# Patient Record
Sex: FEMALE | Race: WHITE | NOT HISPANIC OR LATINO | Employment: OTHER | URBAN - METROPOLITAN AREA
[De-identification: names, ages, dates, MRNs, and addresses within clinical notes are randomized per-mention and may not be internally consistent; named-entity substitution may affect disease eponyms.]

---

## 2023-03-24 ENCOUNTER — DOCUMENTATION ONLY (OUTPATIENT)
Dept: TRANSPLANT | Facility: CLINIC | Age: 53
End: 2023-03-24

## 2023-03-24 ENCOUNTER — TELEPHONE (OUTPATIENT)
Dept: TRANSPLANT | Facility: CLINIC | Age: 53
End: 2023-03-24

## 2023-03-24 NOTE — TELEPHONE ENCOUNTER
"Donor Intake Start:3/11/23Donor Intake Complete:3/11/23  Expiration Date:23  Gender:FemalePreferred Language:English  Full Name:Josy CHEUNG Rose  Needed:[not answered]  Phone Number:[phone] Phone:  Contact Preference:[not answered]Best Contact Time:9am - 2pm  Emergency Contact:Ulises MIGUEL Polina Contact #:[phone]  Relationship to Contact:Contact is my spouse  :66Age:52  Country:[facility]  Address:[address] [place] [address] [place]  State:[place]:[id number]  Height:5'2\"Weight:190lbs  BMI:34.8  Employment Status:EmployedHas PTO for donation?No, not reimbursed  Occupation:self-employed counselorRequires Heavy Lifting?  No  Education Level:Advanced DegreeMarital Status:  Exercise Routine:OccasionalHealth Insurance:  Yes  Blood Type:UnknownEthnicity/Race:White  Donor Type:Standard Voucher Donor  Prefer Remote Donation:[not answered]  Physician:[facility] [place]  Motivation to donate:  Ulises is my spouse, I'd do anything to help save his life.  Living Donor Pre-Screening  Is In [place]  Yes  Will Accept Blood Transfusions?  Yes  Has been Diagnosed with Kidney Disease?  No  Has had a Heart Attack?  No  Has Diabetes?  No  Has had Cancer?  No  Has had Kidney Stones?  No  Has ever been Pregnant?  Yes    - Is Currently Pregnant?  No    - Months Since Pregnancy?24+    - Is Currently Nursing?  No    - Gestational Diabetes?  No    - Hypertension during pregnancy?  Never  Is Planning on Pregnancy?  No  Is Taking Birth Control?  No  Has Used Tobacco  Yes    - Currently uses Tobacco?  No    - Will Stop for Surgery?N/A    - How Many Years:24    - Tobacco use (packs/cans) / Frequency:1 / Daily  Has HIV?  No  Is Currently Incarcerated?  No  Is Currently Residing in [place]  Yes  History Misc  Has Allergies?  No  Has had Surgeries?  Yes  Surgery When  exploratory (something!) 15+ years  Cyst removal 20+ years ago  Takes Medication?  No  Medical " History  History of High BP?  Never  Has History Of CABG (bypass surgery)?  No  History of Blood Clots?  Never  History of Coronary Disease?  Never  Has Stents Implanted?  No  Has History of Chest Pain with Exercise?  No  Has History of Chest Pain at Other Times?  No  Results of Climbing 2 Flights of Stairs?No Problem  Has had Stress Test within Last Year?  No  Has had Stroke?  No  Has had Leg Bypass?  No  History of Lung Disease?  Never  History of COPD?  Never  History of TB?  Never    - Is TB Active?[not answered]  History of Pneumonia?  Never  Has Respiratory Issues?  No  Has Gastro Issues?  No  History of Gallstones?  Never  History of Pancreatitis?  Never  History of Liver Disease?  Never  History of Hepatitis B?  Never    - Is Hep B Active?[not answered]  History of Hepatitis C?  Never  History of Bleeding Problem?  Never  History of UTIs?  Yes    - UTI episodes:2    - Last UTI:12 years  History of Kidney Damage?  Never  History of Proteinuria?  Never  History of Hematuria?  Never  History of Neuro Disease?  Never  History of Seizure?  Never  History of Lupus?  Never  History of Paralysis?  Never  History of Arthritis?  Never  History of Neuropathy?  Never  History of Depression?  Never  History of Anxiety?  Never  History of Documented Psychiatric Illness?  Never  History of Fibroid Uterus?  Never  History of Endometriosis?  Treated in past  History of Polycystic Ovaries?  Never  Has had Miscarriages?  No  Has had Abortions?  No  Has had Transfusions?  No  History of Obesity?  No  History of Fabry's Disease?  No  History of Sickle Cell Disease?  No  History of Sickle Cell Trait?  No  History of Sarcoidosis?  No  History of Auto-Immune Disease  No  Has had Physical Exam?  Yes    - how many years ago:1  Has had Mammogram?  Yes    - how many years ago:2  Has had Pap Smear?  Yes    - how many years ago:5  Has had Colonoscopy?  No  Medical History Comments?Nope!  Living Donor Family Medical History  Anyone with  "kidney disease?  No  Anyone with liver disease?  No  Anyone with heart disease?  No  Anyone with coronary artery disease?  No  Anyone with high blood pressure?  No  Anyone with blood disorder?  No  Anyone with cancer?  No  Anyone with kidney cancer?  No  Anyone with diabetes?  No  Is mother alive?  No  Mother's age?48  Mother's cause of death?Blood clot to the heart  Is father alive?  No  Father's age?62  Father's cause of death?\"Ceased to live\"  How many siblings?4  How many adult children?4  How many children under 18?0  Social History  Has Used Alcohol?  Yes    - currently uses alcohol:  No    - how much:10/Weekly  Has Abused Alcohol?  Yes  Has Used Drugs?  No  Has had legal issues w/ law enforcement?  No  Traveled over 100 miles from home in last year?  Yes    - Traveled Where?[place]  Has had suicidal thoughts or attempts in the last five years?  No  "

## 2023-03-27 ENCOUNTER — TELEPHONE (OUTPATIENT)
Dept: TRANSPLANT | Facility: CLINIC | Age: 53
End: 2023-03-27

## 2023-03-27 NOTE — TELEPHONE ENCOUNTER
Initial Independent Living Donor Advocate contact made with potential donor today.  I introduced myself and my role during the donation process, includin.  BLAIRE ROLE   The federal government requires that all licensed transplant centers provide the living donor with an Independent Living Donor Advocate (BLAIRE).  I do not meet recipients or attend meetings that discuss their care or decision to transplant them. My role is separate to avoid any conflict of interest.  My role is to ensure:  1) your rights are protected;  2) you get all the information you need from the transplant team to make a fully informed decision whether to donate;   3) that living donation is in your best interest.   4) that you have the right to decide NOT to go forward with living donation at any time during this process.  I am available to you throughout the workup, during surgery phase and follow-up at home.     2. WORKUP & PRIVACY     Your identity and workup are not shared with the recipient at any time.     The recipient's insurance covers the medical expenses related to the donor evaluation and surgery.  However, it is important that you carry your own health insurance to address any medical issues that are found and are NOT related to living donation.  Additionally, age appropriate cancer screening (I.e. mammograms,  colonoscopies, etc) is required and would be through your insurance.    There is a psychosocial and medical donor workup that consists of testing to determine if you are healthy enough to donate. Workup tests include tissue typing/genetics, many blood draws, urine collection/ (kidney function testing), chest x-ray, EKG/other heart testing, CT scan. Age appropriate cancer screening is required and would be through your insurance. As you complete each step then you may move on to the next.  Workup can take as little or as long as you need and you can stop the process at any time. Transplant is a treatment option, not a  cure. A kidney from a living kidney donor can last 12-14 years.  Other treatment options are  donation and two types of dialysis.     This is major surgery and your estimated hospital stay is approximately 1-2 nights.  After surgery, there are driving and lifting restrictions - no driving for two weeks and no lifting over ten pounds for 8 weeks.  Donors are routinely off from work for 4 - 6 weeks after surgery, and potentially longer if they have a physical job.       If you anticipate lost wages due to donation, donor wage reimbursement options may be available to you and will be reviewed with you during the evaluation process. Donor Shield and NLDAC explained.    We reviewed the importance of completing follow-up labs and surveys at six months, 1 year and 2 years after donation to monitor kidney health and the impact donation has had on their life post donation.       3.  QUESTIONS    Have you received the Welcome e-mail that includes copies of the informed consent, financial letter, information on donor shield and NLDAC from the transplant department? Yes. Questions? No.    Have you discussed with anyone your potential decision to donate?   Yes.    Is anyone pressuring or coercing you to donate? no    Have you discussed any financial arrangements with recipient around donating a kidney? No.    Are you aware that you can confidentially opt out at any time, up to and including day of donation? Yes.    At this time, would you like to proceed with the medical evaluation to see if you can be a kidney donor?  Yes.    If yes, I will make an appointment for your donor coordinator to reach out to you with next steps.     Contact information for BLAIRE's was provided Yes.    Yvonne Bonds- [phone]  Farnaz Ibrahim- [phone]      Time frame for donation: as soon as possible  Paired exchange was introduced  Yes.      MyChart was initiated  Yes.  CareEverywhere was initiated No.    BLAIRE NOTES: Micki wants to be a  kidney donor for her .  She is scheduled to talk to Addis Crystal RN, who is covering for Jessica Draper RN on Thursday, April 6 at 1:30 PM      Duration of call 30 minutes

## 2023-03-31 ENCOUNTER — TELEPHONE (OUTPATIENT)
Dept: TRANSPLANT | Facility: CLINIC | Age: 53
End: 2023-03-31
Payer: COMMERCIAL

## 2023-03-31 NOTE — TELEPHONE ENCOUNTER
Left VM for Micki to return my phone call otherwise will plan on doing our regular call next week.

## 2023-04-06 ENCOUNTER — TELEPHONE (OUTPATIENT)
Dept: TRANSPLANT | Facility: CLINIC | Age: 53
End: 2023-04-06
Payer: COMMERCIAL

## 2023-04-06 DIAGNOSIS — Z00.5 TRANSPLANT DONOR EVALUATION: Primary | ICD-10-CM

## 2023-04-06 NOTE — TELEPHONE ENCOUNTER
Contacted Josy Ferrera to introduce myself and my role, review of medical/surgical/family history and next steps.     Josy Ferrera  is aware She can stop donor evaluation at any time.    Have you ever been positive for COVID 19? yes    Have you received the COVID vaccination series?not vaccination    Reviewed risk of COVID-19 to living donors.    Regular blood donor? No Last blood donation date  (Notified donor to avoid blood donation at this time to get accurate blood counts if going through evaluation)     Josy Ferrera is a 52 year old female  ABO ? that would like to learn more about      Concerns from medical/surgical/family history: hx of endometriosis.    Current medications and NSAID use: Ibuprofen 3x per week for back and hip pain.  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  She goes to the [facility]    Legal issues w/ law enforcement: none    Reviewed any history of travel in endemic areas: [place], [place]  3 years ago  Strongyloides- [place], [place] and [place].  Trypanosoma cruzi (Chagas)- [place]  West Nile Virus- [place], [place], [place], [place] and [place].     Per our Phase 1 algorithm, does meet criteria to do preliminary testing.     Reviewed evaluation testing: Iohexol, Lab work, CXR, EKG, Provider visits and functions, CT Angiogram.     Reviewed operations of selection committee and angio review meetings and the need for multidisciplinary input. Post-donation requirements include post-op appointment with your surgeon at 2 weeks after surgery, 6 week, 6 month, 1 year and 2 year lab tests.     I Reviewed NKR listing and transfer of care to KPD team if approved. Provided Micki with NKR website to review.     IBriefly went over options if approved of NDD and voucher donation.     Micki would like to proceed with next steps:       Confirmed that Micki reviewed Informed consent document and all questions answered.  Reviewed that they will receive  Docusign to obtain electronic signature for the following: Informed consent, SRTR data, KALEE for medical information, Auth for Electronic communication and will need their signed consent back before proceeding with evaluation.      Encouraged sign up for "[employer]"hart and reviewed importance of watching teaching videos prior to evaluation.    I Verified recipient status if not NDD.    Donor timeline: When  is ready.  Micki needs to lose 10 lbs to meet requirement of 33 in order to come in for eval.     Will send orders to scheduling team to set up for phase 1 testing. testing.

## 2023-04-13 NOTE — TELEPHONE ENCOUNTER
"Jessica  I have no idea how to use this my chart...I'm going in circles!  Please call me tomorrow am and let me know what i\"m supposed to do at this point.  Thanks.  Octavia      I called Octavia and left a message that I would try back tomorrow.  Not to worry about My Chart.  We will figure it out.  "

## 2023-04-14 ENCOUNTER — TELEPHONE (OUTPATIENT)
Dept: TRANSPLANT | Facility: CLINIC | Age: 53
End: 2023-04-14
Payer: COMMERCIAL

## 2023-04-14 NOTE — TELEPHONE ENCOUNTER
I called Micki because she sen an email message that she was having trouble with MY Chart.  I LM that she can talk to Jessica next week.  I will ask Jessica to call her.

## 2023-04-17 ENCOUNTER — TELEPHONE (OUTPATIENT)
Dept: TRANSPLANT | Facility: CLINIC | Age: 53
End: 2023-04-17
Payer: COMMERCIAL

## 2023-04-17 NOTE — TELEPHONE ENCOUNTER
Spoke to Micki and reviewed what she needed to complete (phase 1's). I told her to reach out with any further questions or concerns.

## 2023-05-02 ENCOUNTER — TELEPHONE (OUTPATIENT)
Dept: TRANSPLANT | Facility: CLINIC | Age: 53
End: 2023-05-02
Payer: COMMERCIAL

## 2023-05-02 NOTE — TELEPHONE ENCOUNTER
LM asking Octavia if she has done her Phase 1's yet--to let me know. To also let us know if she's having trouble getting them done or has changed her mind.

## 2023-05-03 ENCOUNTER — TELEPHONE (OUTPATIENT)
Dept: TRANSPLANT | Facility: CLINIC | Age: 53
End: 2023-05-03
Payer: COMMERCIAL

## 2023-05-03 NOTE — TELEPHONE ENCOUNTER
Spoke to Josy regarding phase 1 results.    Josy Ferrera  is aware She can stop donor evaluation at any time.     Have you ever been positive for COVID 19? yes     Have you received the COVID vaccination series?not vaccination     Reviewed risk of COVID-19 to living donors.     Regular blood donor? No Last blood donation date  (Notified donor to avoid blood donation at this time to get accurate blood counts if going through evaluation)     Josy Ferrera is a 52 year old female  ABO A that would like to learn more about donation to .     Concerns from medical/surgical/family history: hx of endometriosis.     Current medications and NSAID use: Ibuprofen 3x per week for back and hip pain.  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  She goes to the [facility]     Legal issues w/ law enforcement: none     Reviewed any history of travel in endemic areas: [place], [place]  3 years ago  Strongyloides- [place], [place] and [place].  Trypanosoma cruzi (Chagas)- [place]  West Nile Virus- [place], [place], [place], [place] and [place].      Reviewed evaluation testing: Iohexol, Lab work, CXR, EKG, Provider visits and functions, CT Angiogram.     Reviewed operations of selection committee and angio review meetings and the need for multidisciplinary input. Post-donation requirements include post-op appointment with your surgeon at 2 weeks after surgery, 6 week, 6 month, 1 year and 2 year lab tests.     I Reviewed NKR listing and transfer of care to KPD team if approved. Provided Micki with NKR website to review.     IBriefly went over options if approved of NDD and voucher donation.     Micki would like to proceed with next steps:  weight loss (down to 180 pounds before eval) I am going to send Lauren (dietician) a message to have her connect with Micki. Micki has been trying to lose weight over the past year and it's been a challenge for her. She will keep in contact with us  and let me know once she hits her goal so she can come to eval.      Confirmed that Micki reviewed Informed consent document and all questions answered.  Reviewed that they will receive Docusign to obtain electronic signature for the following: Informed consent, SRTR data, KALEE for medical information, Auth for Electronic communication and will need their signed consent back before proceeding with evaluation.      Encouraged sign up for MyChart and reviewed importance of watching teaching videos prior to evaluation.    Verified recipient status if not NDD.

## 2023-05-12 ENCOUNTER — TELEPHONE (OUTPATIENT)
Dept: TRANSPLANT | Facility: CLINIC | Age: 53
End: 2023-05-12
Payer: COMMERCIAL

## 2023-05-12 NOTE — TELEPHONE ENCOUNTER
"Called Micki per request of donor coordinator to discus 15 lb weight loss recommended prior to coming in to donor eval clinic.     Pt reports having \"tried everything\" to help lose weight. However, if she doesn't see results quickly, she doesn't stick to it and \"gives up\". Weight is up ~10 lbs in the past few years, but total of ~60 lb weight gain when she quit smoking and went through menopause 15 years ago.     She does not \"like eating, eating bores me\". She eats 1 meal/day on average.   B- homemade egg sandwich or skips  Sn- candy (10 pieces during the day at work)  D- may skip or grabs a few tacos on the way home from work  [place]- water, coffee, no alcohol     Exercise: no routine activity. Reports her son is a  and has offered workouts, plans, etc, but pt doesn't see results so doesn't continue with it. She does have a treadmill at home, but hasn't used in months.     Reviewed with Micki about timing of eating, what she eats, etc. We reviewed behavioral aspects of weight loss, realistic goals (up to 2 lb/week weight loss), finding her motivation/her \"why\", which only she can do.   She reports she would rather drink something than eat. She did just order Her[employer] shakes. Encouraged her to include 1 of these/day + 1 \"meal\".     Will remain available for support.       "

## 2023-05-21 ENCOUNTER — HEALTH MAINTENANCE LETTER (OUTPATIENT)
Age: 53
End: 2023-05-21

## 2023-09-08 ENCOUNTER — TELEPHONE (OUTPATIENT)
Dept: TRANSPLANT | Facility: CLINIC | Age: 53
End: 2023-09-08
Payer: COMMERCIAL

## 2023-09-08 NOTE — TELEPHONE ENCOUNTER
Pt stated she called a week  ago to see if she completed her work up  Needs a call back today if possible

## 2023-09-11 ENCOUNTER — TELEPHONE (OUTPATIENT)
Dept: TRANSPLANT | Facility: CLINIC | Age: 53
End: 2023-09-11
Payer: COMMERCIAL

## 2023-09-11 DIAGNOSIS — Z00.5 TRANSPLANT DONOR EVALUATION: Primary | ICD-10-CM

## 2023-09-11 RX ORDER — ALBUTEROL SULFATE 90 UG/1
1-2 AEROSOL, METERED RESPIRATORY (INHALATION)
Status: CANCELLED
Start: 2023-09-22

## 2023-09-11 RX ORDER — ALBUTEROL SULFATE 0.83 MG/ML
2.5 SOLUTION RESPIRATORY (INHALATION)
Status: CANCELLED | OUTPATIENT
Start: 2023-09-22

## 2023-09-11 RX ORDER — DIPHENHYDRAMINE HYDROCHLORIDE 50 MG/ML
50 INJECTION INTRAMUSCULAR; INTRAVENOUS
Status: CANCELLED
Start: 2023-09-22

## 2023-09-11 RX ORDER — MEPERIDINE HYDROCHLORIDE 25 MG/ML
25 INJECTION INTRAMUSCULAR; INTRAVENOUS; SUBCUTANEOUS EVERY 30 MIN PRN
Status: CANCELLED | OUTPATIENT
Start: 2023-09-22

## 2023-09-11 RX ORDER — METHYLPREDNISOLONE SODIUM SUCCINATE 125 MG/2ML
125 INJECTION, POWDER, LYOPHILIZED, FOR SOLUTION INTRAMUSCULAR; INTRAVENOUS
Status: CANCELLED
Start: 2023-09-22

## 2023-09-11 RX ORDER — EPINEPHRINE 1 MG/ML
0.3 INJECTION, SOLUTION, CONCENTRATE INTRAVENOUS EVERY 5 MIN PRN
Status: CANCELLED | OUTPATIENT
Start: 2023-09-22

## 2023-09-11 NOTE — TELEPHONE ENCOUNTER
"Octavia called wanting to know next step. Has lost 6#'s-ideal was 15 #'s.Has done her Pap,Mammo,colonoscopy.Very anxious and wants to come for eval. [facility] for 9/22 slot 2. When I began telling her the day's details,she replied \"I was there with my daughter-I know what happens!\"  Asked me what time she needs to be here.[facility] to [facility] she [facility] replied \"I don't look at that-I don't know how!\"Will try to get her a schedule and send to her.  "

## 2023-09-14 ENCOUNTER — DOCUMENTATION ONLY (OUTPATIENT)
Dept: TRANSPLANT | Facility: CLINIC | Age: 53
End: 2023-09-14
Payer: COMMERCIAL

## 2023-09-25 ENCOUNTER — TELEPHONE (OUTPATIENT)
Dept: TRANSPLANT | Facility: CLINIC | Age: 53
End: 2023-09-25
Payer: COMMERCIAL

## 2023-09-25 DIAGNOSIS — Z00.5 TRANSPLANT DONOR EVALUATION: Primary | ICD-10-CM

## 2023-09-25 RX ORDER — METHYLPREDNISOLONE SODIUM SUCCINATE 125 MG/2ML
125 INJECTION, POWDER, LYOPHILIZED, FOR SOLUTION INTRAMUSCULAR; INTRAVENOUS
Status: CANCELLED
Start: 2023-10-13

## 2023-09-25 RX ORDER — DIPHENHYDRAMINE HYDROCHLORIDE 50 MG/ML
50 INJECTION INTRAMUSCULAR; INTRAVENOUS
Status: CANCELLED
Start: 2023-10-13

## 2023-09-25 RX ORDER — EPINEPHRINE 1 MG/ML
0.3 INJECTION, SOLUTION, CONCENTRATE INTRAVENOUS EVERY 5 MIN PRN
Status: CANCELLED | OUTPATIENT
Start: 2023-10-13

## 2023-09-25 RX ORDER — ALBUTEROL SULFATE 90 UG/1
1-2 AEROSOL, METERED RESPIRATORY (INHALATION)
Status: CANCELLED
Start: 2023-10-13

## 2023-09-25 RX ORDER — ALBUTEROL SULFATE 0.83 MG/ML
2.5 SOLUTION RESPIRATORY (INHALATION)
Status: CANCELLED | OUTPATIENT
Start: 2023-10-13

## 2023-09-25 RX ORDER — MEPERIDINE HYDROCHLORIDE 25 MG/ML
25 INJECTION INTRAMUSCULAR; INTRAVENOUS; SUBCUTANEOUS EVERY 30 MIN PRN
Status: CANCELLED | OUTPATIENT
Start: 2023-10-13

## 2023-09-25 NOTE — TELEPHONE ENCOUNTER
Received message from Octavia wanting to resched eval since she had to call in for appt last week to cancel. Sched for 10/13 slot 4.

## 2023-10-12 LAB
A1 AG RBC QL: POSITIVE
ABO/RH(D): NORMAL
ANTIBODY SCREEN: NEGATIVE
SPECIMEN EXPIRATION DATE: NORMAL
SPECIMEN EXPIRATION DATE: NORMAL

## 2023-10-13 ENCOUNTER — OFFICE VISIT (OUTPATIENT)
Dept: INFUSION THERAPY | Facility: CLINIC | Age: 53
End: 2023-10-13
Attending: INTERNAL MEDICINE

## 2023-10-13 ENCOUNTER — OFFICE VISIT (OUTPATIENT)
Dept: TRANSPLANT | Facility: CLINIC | Age: 53
End: 2023-10-13
Attending: INTERNAL MEDICINE

## 2023-10-13 ENCOUNTER — LAB (OUTPATIENT)
Dept: LAB | Facility: CLINIC | Age: 53
End: 2023-10-13
Attending: INTERNAL MEDICINE

## 2023-10-13 ENCOUNTER — DOCUMENTATION ONLY (OUTPATIENT)
Dept: TRANSPLANT | Facility: CLINIC | Age: 53
End: 2023-10-13

## 2023-10-13 VITALS
WEIGHT: 197.31 LBS | HEART RATE: 62 BPM | TEMPERATURE: 97.6 F | OXYGEN SATURATION: 95 % | BODY MASS INDEX: 36.31 KG/M2 | HEIGHT: 62 IN | DIASTOLIC BLOOD PRESSURE: 77 MMHG | SYSTOLIC BLOOD PRESSURE: 122 MMHG

## 2023-10-13 VITALS
RESPIRATION RATE: 16 BRPM | OXYGEN SATURATION: 98 % | HEART RATE: 59 BPM | TEMPERATURE: 98 F | DIASTOLIC BLOOD PRESSURE: 66 MMHG | SYSTOLIC BLOOD PRESSURE: 132 MMHG | WEIGHT: 197.4 LBS

## 2023-10-13 DIAGNOSIS — Z00.5 TRANSPLANT DONOR EVALUATION: Primary | ICD-10-CM

## 2023-10-13 DIAGNOSIS — Z00.5 TRANSPLANT DONOR EVALUATION: ICD-10-CM

## 2023-10-13 LAB
A1 AG RBC QL: POSITIVE
ABO/RH(D): NORMAL
ALBUMIN SERPL BCG-MCNC: 4.6 G/DL (ref 3.5–5.2)
ALP SERPL-CCNC: 62 U/L (ref 35–104)
ALT SERPL W P-5'-P-CCNC: 14 U/L (ref 0–50)
ANION GAP SERPL CALCULATED.3IONS-SCNC: 9 MMOL/L (ref 7–15)
APTT PPP: 32 SECONDS (ref 22–38)
AST SERPL W P-5'-P-CCNC: 17 U/L (ref 0–45)
BILIRUB SERPL-MCNC: 0.3 MG/DL
BUN SERPL-MCNC: 16.7 MG/DL (ref 6–20)
CALCIUM SERPL-MCNC: 9.2 MG/DL (ref 8.6–10)
CHLORIDE SERPL-SCNC: 107 MMOL/L (ref 98–107)
CHOLEST SERPL-MCNC: 229 MG/DL
CMV IGG SERPL IA-ACNC: 7.3 U/ML
CMV IGG SERPL IA-ACNC: ABNORMAL
CREAT SERPL-MCNC: 0.76 MG/DL (ref 0.51–0.95)
DEPRECATED HCO3 PLAS-SCNC: 26 MMOL/L (ref 22–29)
EBV VCA IGG SER IA-ACNC: 56.9 U/ML
EBV VCA IGG SER IA-ACNC: POSITIVE
EBV VCA IGM SER IA-ACNC: <10 U/ML
EBV VCA IGM SER IA-ACNC: NORMAL
EGFRCR SERPLBLD CKD-EPI 2021: >90 ML/MIN/1.73M2
ERYTHROCYTE [DISTWIDTH] IN BLOOD BY AUTOMATED COUNT: 12.7 % (ref 10–15)
GLUCOSE SERPL-MCNC: 100 MG/DL (ref 70–99)
HBA1C MFR BLD: 5.5 %
HBV CORE AB SERPL QL IA: NONREACTIVE
HBV SURFACE AB SERPL IA-ACNC: 0.47 M[IU]/ML
HBV SURFACE AB SERPL IA-ACNC: NONREACTIVE M[IU]/ML
HBV SURFACE AG SERPL QL IA: NONREACTIVE
HCG INTACT+B SERPL-ACNC: 2 MIU/ML
HCT VFR BLD AUTO: 39.5 % (ref 35–47)
HCV AB SERPL QL IA: NONREACTIVE
HDLC SERPL-MCNC: 49 MG/DL
HGB BLD-MCNC: 13.6 G/DL (ref 11.7–15.7)
HIV 1+2 AB+HIV1 P24 AG SERPL QL IA: NONREACTIVE
INR PPP: 1.09 (ref 0.85–1.15)
LDLC SERPL CALC-MCNC: 153 MG/DL
MCH RBC QN AUTO: 29.6 PG (ref 26.5–33)
MCHC RBC AUTO-ENTMCNC: 34.4 G/DL (ref 31.5–36.5)
MCV RBC AUTO: 86 FL (ref 78–100)
NONHDLC SERPL-MCNC: 180 MG/DL
PHOSPHATE SERPL-MCNC: 3 MG/DL (ref 2.5–4.5)
PLATELET # BLD AUTO: 236 10E3/UL (ref 150–450)
POTASSIUM SERPL-SCNC: 4.2 MMOL/L (ref 3.4–5.3)
PROT SERPL-MCNC: 6.8 G/DL (ref 6.4–8.3)
RBC # BLD AUTO: 4.59 10E6/UL (ref 3.8–5.2)
SODIUM SERPL-SCNC: 142 MMOL/L (ref 135–145)
SPECIMEN EXPIRATION DATE: NORMAL
T PALLIDUM AB SER QL: NONREACTIVE
TRIGL SERPL-MCNC: 134 MG/DL
URATE SERPL-MCNC: 5.3 MG/DL (ref 2.4–5.7)
WBC # BLD AUTO: 3.6 10E3/UL (ref 4–11)

## 2023-10-13 PROCEDURE — 87340 HEPATITIS B SURFACE AG IA: CPT

## 2023-10-13 PROCEDURE — 86780 TREPONEMA PALLIDUM: CPT

## 2023-10-13 PROCEDURE — 86665 EPSTEIN-BARR CAPSID VCA: CPT

## 2023-10-13 PROCEDURE — 85027 COMPLETE CBC AUTOMATED: CPT

## 2023-10-13 PROCEDURE — 86905 BLOOD TYPING RBC ANTIGENS: CPT

## 2023-10-13 PROCEDURE — 86803 HEPATITIS C AB TEST: CPT

## 2023-10-13 PROCEDURE — 86644 CMV ANTIBODY: CPT

## 2023-10-13 PROCEDURE — 84702 CHORIONIC GONADOTROPIN TEST: CPT

## 2023-10-13 PROCEDURE — 86704 HEP B CORE ANTIBODY TOTAL: CPT

## 2023-10-13 PROCEDURE — 86901 BLOOD TYPING SEROLOGIC RH(D): CPT

## 2023-10-13 PROCEDURE — 86850 RBC ANTIBODY SCREEN: CPT

## 2023-10-13 PROCEDURE — 86789 WEST NILE VIRUS ANTIBODY: CPT

## 2023-10-13 PROCEDURE — 84100 ASSAY OF PHOSPHORUS: CPT

## 2023-10-13 PROCEDURE — 86481 TB AG RESPONSE T-CELL SUSP: CPT

## 2023-10-13 PROCEDURE — 85730 THROMBOPLASTIN TIME PARTIAL: CPT

## 2023-10-13 PROCEDURE — 84550 ASSAY OF BLOOD/URIC ACID: CPT

## 2023-10-13 PROCEDURE — 255N000002 HC RX 255 OP 636: Performed by: INTERNAL MEDICINE

## 2023-10-13 PROCEDURE — 36415 COLL VENOUS BLD VENIPUNCTURE: CPT

## 2023-10-13 PROCEDURE — 99214 OFFICE O/P EST MOD 30 MIN: CPT | Performed by: SURGERY

## 2023-10-13 PROCEDURE — 86706 HEP B SURFACE ANTIBODY: CPT

## 2023-10-13 PROCEDURE — 83036 HEMOGLOBIN GLYCOSYLATED A1C: CPT

## 2023-10-13 PROCEDURE — 85610 PROTHROMBIN TIME: CPT

## 2023-10-13 PROCEDURE — 80061 LIPID PANEL: CPT

## 2023-10-13 PROCEDURE — 80053 COMPREHEN METABOLIC PANEL: CPT

## 2023-10-13 PROCEDURE — 99213 OFFICE O/P EST LOW 20 MIN: CPT | Performed by: SURGERY

## 2023-10-13 RX ORDER — DIPHENHYDRAMINE HYDROCHLORIDE 50 MG/ML
50 INJECTION INTRAMUSCULAR; INTRAVENOUS
Status: CANCELLED
Start: 2023-10-13

## 2023-10-13 RX ORDER — MEPERIDINE HYDROCHLORIDE 25 MG/ML
25 INJECTION INTRAMUSCULAR; INTRAVENOUS; SUBCUTANEOUS EVERY 30 MIN PRN
Status: CANCELLED | OUTPATIENT
Start: 2023-10-13

## 2023-10-13 RX ORDER — ALBUTEROL SULFATE 0.83 MG/ML
2.5 SOLUTION RESPIRATORY (INHALATION)
Status: CANCELLED | OUTPATIENT
Start: 2023-10-13

## 2023-10-13 RX ORDER — EPINEPHRINE 1 MG/ML
0.3 INJECTION, SOLUTION INTRAMUSCULAR; SUBCUTANEOUS EVERY 5 MIN PRN
Status: CANCELLED | OUTPATIENT
Start: 2023-10-13

## 2023-10-13 RX ORDER — ALBUTEROL SULFATE 90 UG/1
1-2 AEROSOL, METERED RESPIRATORY (INHALATION)
Status: CANCELLED
Start: 2023-10-13

## 2023-10-13 RX ORDER — METHYLPREDNISOLONE SODIUM SUCCINATE 125 MG/2ML
125 INJECTION, POWDER, LYOPHILIZED, FOR SOLUTION INTRAMUSCULAR; INTRAVENOUS
Status: CANCELLED
Start: 2023-10-13

## 2023-10-13 RX ADMIN — IOHEXOL 4 ML: 350 INJECTION, SOLUTION INTRAVENOUS at 08:40

## 2023-10-13 ASSESSMENT — PAIN SCALES - GENERAL
PAINLEVEL: NO PAIN (0)
PAINLEVEL: SEVERE PAIN (7)

## 2023-10-13 NOTE — PROGRESS NOTES
Donor Iohexol test    Josy Uribe presents today to [facility] for a Donor Iohexol test.      Progress note:  ID verified by name and .     The following information was verified with the patient:  Female Patients is there any possibility of being pregnant No  Is there a history of allergy (skin rash, swelling, ect) to:   A.  Iodine (except skin reactions to betadine): No   B. Intravenous radio-contrast agents: No   C. Seafood No     present during visit today: Not Applicable.    R.N. provided patient with educational handout regarding timed test. Yes    24G PIV placed in right forearm and Iohexol administered over 2 minutes.  Positive blood return verified before and after injection. PIV removed.  20 gauge PIV placed in left AC for blood draws and CT this afternoon.    Medication administered:  Iohexol (Omnipaque 300mg iodine/ml concentration) 5 mls.    Start time: 0840  Stop time: 0842      Administrations This Visit       iohexol (OMNIPAQUE) 350 MG/ML injectable solution 4 mL       Admin Date  10/13/2023 Action  $Given Dose  4 mL Route  Intravenous Documented By  Liz Owens RN                    Evaluation nurse in transplant to draw labs at 2 and 4 hours post iohexol administration.  Patient given a slip with the times to get labs drawn and verbalized understanding of the plan.    Patient tolerated the procedure:  Yes    After the infusion patient was discharged to the next appointment.    Rikki Gunn RN

## 2023-10-13 NOTE — NURSING NOTE
Chief Complaint   Patient presents with    Blood Draw     Labs drawn via piv by rn in lab. VS taken.     Labs drawn from PIV placed by RN. Line flushed with saline. Vitals taken. Pt checked in for appointment(s).    Patient unable to void. Urine kit sent with patient to next appointments.    Jarrod Mascorro RN

## 2023-10-13 NOTE — PROGRESS NOTES
[facility]  Outpatient MNT: Kidney Donor Evaluation    Current BMI: 36 (HT 62 in,  lbs/90 kg)  Weight loss per MD discretion     8 Year Estimated Risk of T2DM  18%- , BMI 36, low HDL      Time Spent: 15 minutes  Visit Type: Initial  Referring Physician: Alexys   Pt accompanied by: self     Nutrition Assessment  I talked with pt back in May on the telephone to review strategies for weight loss prior to donor evaluation (goal told to patient was to reach 180# prior to coming into clinic). Since that time, she has cut out snacking on candy and has gone from 1 meal/day--> TID meals. Her weight at home is down 7 lbs to 189 lbs, yet not reflective on clinic scale today.     H/o kidney stones: no   H/o GDM or HTN in pregnancy (if applicable): no  Parental h/o DM: no    Vitamins, Supplements, Pertinent Meds: occasional vit D, B, CoQ10  Herbal Medicines/Supplements: none     Weight hx: per CE, weight same as in May (196 lbs)  Wt Readings from Last 10 Encounters:   10/13/23 89.5 kg (197 lb 6.4 oz)   10/13/23 89.5 kg (197 lb 5 oz)   5/2023 196 lbs  1/2023 194 lbs  12/2022 190 lbs  9/2021 189 lbs     Diet Recall  Pt reports seldom eating red meat and has cut back on cholesterol foods. She reports being told during her colonoscopy that her cholesterol was high - unable to see any mention of this or recent labs verifying this.  Breakfast WW toast (margarine) w/ oatmeal   Lunch Salad (spring mix with strawberries, orange, rasp vinaigrette) + chicken    Dinner Tacos (chicken)   Snacks Nutri grain bar; almonds w/ craisins & white cheddar cheese; fruit     Beverages Water (40 oz), coffee (24 oz/day)    Alcohol None    Dining out 2-3x/week (tacos, bowl from [employer])     Physical Activity  No routine activity    Has treadmill but not functional now     Labs  Recent Labs   Lab Test 10/13/23  0816   CHOL 229*   HDL 49*   *   TRIG 134     FBG = 100  A1c = 5.5  BP = wnl x 3      Prediction of Incident Diabetes Mellitus in Middle-aged Adults: The [place] Offspring Study  Stephane Mccrary MD; James B. Meigs, MD, MPH; Niecy Aquino, PhD; Brenna Kwon MD, MPH; Jimbo Courtney MD; Adam Hill Sr,   PhD  Pt's estimated risk for T2DM (per Table 6 above)  Pt received points for the following criteria: FBG>100, BMI>30, low HDL   Total points: 20  8-Year estimated risk of T2DM: 18%    Nutrition Diagnosis  No nutrition diagnosis identified at this time.    Nutrition Intervention  Nutrition education provided:  Reviewed overall healthy diet guidelines for pre and post kidney donation. Discussed maintenance of a healthy weight and Na+ intake <3000 mg/day (<2000 mg/day if HTN).    Reviewed further weight loss TBD by surgeon vs maintaining weight & avoiding weight regain. Her weight in actuality has been stable over the years. Consider some physical activity (which patient is wanting to do, unsure on time line when she can use her treadmill). Consider adding protein in the AM a few times/week.     Avoid the following post op d/t unknown effects on the organs:  - Herbal, Chinese, holistic, chiropractic, natural, alternative medicines and supplements  - Detoxes and cleanses  - Weight loss pills  - Protein powders or other products with extracts or herbs (ie green tea extract)    Patient Understanding: Pt verbalized understanding of education provided.  Expected Engagement: Good  Follow-Up Plans: PRN     Nutrition Goals  No nutrition goals identified at this time     Lauren Goel, RD, LD, CCTD

## 2023-10-13 NOTE — NURSING NOTE
"Chief Complaint   Patient presents with    Transplant Donor Evaluation     Kidney donor evaluation         BP 1: 128/85  BP 2: 121/66  BP 3: 117/82    /77   Pulse 62   Temp 97.6  F (36.4  C) (Oral)   Ht 1.575 m (5' 2\")   Wt 89.5 kg (197 lb 5 oz)   SpO2 95%   BMI 36.09 kg/m      Star Wilde RN on 10/13/2023 at 9:02 AM   "

## 2023-10-13 NOTE — LETTER
10/13/2023         RE: Josy Uribe  [address]  [facility] [address]        Dear Colleague,    Thank you for referring your patient, Josy Uribe, to the [facility]. Please see a copy of my visit note below.    Donor Iohexol test    Josy Uribe presents today to [facility] for a Donor Iohexol test.      Progress note:  ID verified by name and .     The following information was verified with the patient:  Female Patients is there any possibility of being pregnant No  Is there a history of allergy (skin rash, swelling, ect) to:   A.  Iodine (except skin reactions to betadine): No   B. Intravenous radio-contrast agents: No   C. Seafood No     present during visit today: Not Applicable.    R.N. provided patient with educational handout regarding timed test. Yes    24G PIV placed in right forearm and Iohexol administered over 2 minutes.  Positive blood return verified before and after injection. PIV removed.  20 gauge PIV placed in left AC for blood draws and CT this afternoon.    Medication administered:  Iohexol (Omnipaque 300mg iodine/ml concentration) 5 mls.    Start time: 0840  Stop time: 0842      Administrations This Visit       iohexol (OMNIPAQUE) 350 MG/ML injectable solution 4 mL       Admin Date  10/13/2023 Action  $Given Dose  4 mL Route  Intravenous Documented By  Liz Owens, HAI                    Evaluation nurse in transplant to draw labs at 2 and 4 hours post iohexol administration.  Patient given a slip with the times to get labs drawn and verbalized understanding of the plan.    Patient tolerated the procedure:  Yes    After the infusion patient was discharged to the next appointment.    Rikki Gunn RN        Again, thank you for allowing me to participate in the care of your patient.        Sincerely,        Specialty Infusion Nurse

## 2023-10-13 NOTE — PROGRESS NOTES
Micki here for evaluation day- we are cancelling her evaluation per Dr Reardon. She is not a candidate at this time.

## 2023-10-13 NOTE — LETTER
10/13/2023       RE: Josy Uribe  [address]  [facility] [address]     Dear Colleague,    Thank you for referring your patient, Josy Uribe, to the [facility] at [facility]. Please see a copy of my visit note below.      TRANSPLANT NEPHROLOGY DONOR EVALUATION    Assessment and Plan:  # Prospective Kidney Transplant Donor: Patient with a few issues that need to be addressed prior to donation. Patient's blood pressure is acceptable at this visit, kidney function appears to be acceptable with Iohexol pending, and urinalysis is bland.    # Obesity: Patient has a BMI of 36.1 with significant central obesity and would recommend weight loss to decrease surgical risk and for their overall health.  Will defer to Transplant Surgery on any weight loss goal and acceptability for donation.     # Chronic Back Pain: Patient with significant and ongoing back pain, which requires regular use of NSAIDs to manage.  Discussed that her symptoms could worsen following donor surgery.  Also discussed potential long-term risk of regular NSAID use, including kidney damage.  Do not feel patient is a good donor candidate due to this risk and medical issue.    # Low WBC: Mildly low WBC.  Patient denies any infectious symptoms.  She does tend to run low normal WBC.   - Would repeat WBC.  This can be followed up by her PCP.    # Family h/o Blood Clots: Patient reports her mother had a blood clot, although details are not known.  Patient has no h/o blood clots, including with a couple of minor surgeries.   - Would consider further evaluation should patient require a surgery.    Discussed the risks of donating a kidney, including the surgical risk and the possible risks of living with one kidney.    Education about expected post-donation kidney function and how chronic kidney disease (CKD) and end stage kidney disease (ESKD) might potentially impact the  donor in the future, include, but not limited to:       - On average, donors will have 25-35% permanent loss of kidney function at donation.       - Baseline risk of ESKD may slightly exceed that of members of the general        population with the same demographic profile.       - Donor risks must be interpreted in light of known epidemiology of both CKD or         ESKD, such as that CKD generally develops in midlife (40-50 years old) and ESKD         generally develops after age 60.       - Medical evaluation of young potential donors cannot predict lifetime risk of CKD or         ESKD.       - Donors may be at higher risk for CKD if they sustain damage to the remaining         kidney.       - Development of CKD and progression of ESKD may be more rapid with only 1         kidney.       - Some type of kidney replacement therapy, either kidney transplant or dialysis, is         required when reaching ESKD.    Potential medical or surgical risks include, but not limited to:       - Death.       - Scars, pain, fatigue, and other consequences typical of any surgical procedure.       - Decreased kidney function.       - Abdominal or bowel symptoms, such as bloating and nausea, and developing         bowel obstruction.       - Kidney failure (ESKD) and the need for a kidney transplant or dialysis for the donor.       - Impact of obesity, hypertension, or other donor-specific medical conditions on         morbidity and mortality of the potential donor.    Patients overall evaluation will be discussed with the transplant team and a final recommendation on the patients' suitability to be a kidney transplant donor will be made at that time.    Consult:  Josy Uribe was seen in consultation at the request of Dr. Brandon Reardon for evaluation as a potential kidney transplant donor.    Reason for Visit:  Josy Uribe is a 53 year old female who presents for a kidney donor evaluation.  Patient would like to donate  to Ulises Ferrera, her  .    Present Condition and Donor-Related Medical History:    Energy level is good and has been normal.  She is active, although only gets minimal exercise.  Patient's activity can be limited by chronic back pain.  Denies any chest pain or shortness of breath with exertion.  Appetite is good and no recent weight change.  No nausea, vomiting or diarrhea.  No fever, sweats or chills, but does get hot flashes.  No leg swelling.  No pain or burning with urination.    Patient's ongoing back pain has been an issues for years.  She presently takes ibuprofen 600 mg daily on most days for several years.  She has been trying to live with the pain.  Patient has tried physical therapy, chiropractor and message therapy without success.  She does not have health insurance and hasn't been evaluated by an orthopedic surgeon or neurosurgeon.           Kidney Disease Hx:       h/o Kidney Problems: No   Family h/o Genetic Kidney Disease: No       h/o Hypertension: No      Usual Blood Pressure:  110/60-70s       h/o Protein in Urine: No    h/o Blood in Urine: No       h/o Kidney Stones: No     h/o Kidney Injury: No       h/o Recurrent UTI: No   h/o Genitourinary Problems: No       h/o Chronic NSAID Use: Yes          Other Medical Hx:       h/o Diabetes: No             h/o Gastrointestinal, Pancreas or Liver Problems: No       h/o Lung or Heart Problems: No       h/o Hematologic Problems: No  h/o Bleeding or Clotting Problems: No       h/o Cancer: No       h/o Infection Problems: No       H/o Gestational DM: No      H/o Gestational HTN: No     H/o Preeclampsia: No         Skin Cancer Risk:       h/o more than 50 moles: No       h/o extensive sun exposure: No       h/o melanoma: No       Family h/o melanoma: Yes: In her brother         Mental Health Assessment:       h/o Depression: No       h/o Psychiatric Illness: No       h/o Suicidal Attempt(s): No    COVID Status:  Vaccination Up To Date: Not  vaccinated  H/o COVID Infection: Yes; with no residual symptoms.     Review Of Systems:   A comprehensive review of systems was obtained and negative, except as noted in the HPI or PMH.    Past Medical History:   History was taken from the patient as noted below.  Past Medical History:   Diagnosis Date    Alcoholism (H)     DJD (degenerative joint disease) of thoracic spine     Dyslipidemia     Endometriosis     treated in past    Obesity     Palpitations     Vitamin D deficiency        Past Social History:   Past Surgical History:   Procedure Laterality Date    CYST REMOVAL  2002    LAPAROSCOPY DIAGNOSTIC (GENERAL)      WISDOM TOOTH EXTRACTION       Personal or family history of anesthesia problems: No    Family History:   Family History   Problem Relation Age of Onset    Thrombosis Mother     Failure to thrive Father     Cerebrovascular Disease Father     Melanoma Brother     Hypertension Brother           Specific Family History in First Degree Relatives:       FH of Kidney Dz: No  FH of Diabetes: No       FH of Hypertension: Yes  FH of CAD: No       FH of Cancer: Yes   FH of Kidney Cancer: No    Personal History:   Social History     Socioeconomic History    Marital status:      Spouse name: Not on file    Number of children: 4    Years of education: Not on file    Highest education level: Not on file   Occupational History    Not on file   Tobacco Use    Smoking status: Former     Years: 24     Types: Cigarettes     Quit date: 2008     Years since quitting: 15.8    Smokeless tobacco: Never   Substance and Sexual Activity    Alcohol use: Not Currently     Comment: Sober since 1996    Drug use: Never    Sexual activity: Not on file   Other Topics Concern    Not on file   Social History Narrative    Not on file     Social Determinants of Health     Financial Resource Strain: Not on file   Food Insecurity: Not on file   Transportation Needs: Not on file   Physical Activity: Not on file   Stress: Not on file    Social Connections: Not on file   Interpersonal Safety: Not on file   Housing Stability: Not on file          Specific Social History:       Health Insurance Status: No       Employment Status: Part time  Occupation: Mental health practitioner and licensed counselor                     Living Arrangements: lives with their spouse       Social Support: Yes       Presence of increased risk for disease transmission behaviors as defined by PHS guidelines: No        Allergies:  No Known Allergies    Medications:  No current outpatient medications on file.     No current facility-administered medications for this visit.     There are no discontinued medications.      Vitals:      10/13/2023     9:04 AM 10/13/2023     9:06 AM 10/13/2023     9:07 AM   Vital Signs   Systolic 121 117 122   Diastolic 66 79 77       Exam:   GENERAL APPEARANCE: alert and no distress  HENT: mouth without ulcers or lesions  RESP: lungs clear to auscultation - no rales, rhonchi or wheezes  CV: regular rhythm, normal rate, no rub, no murmur  EDEMA: no LE edema bilaterally  ABDOMEN: soft, nondistended, nontender, bowel sounds normal, obese  MS: extremities normal - no gross deformities noted, no evidence of inflammation in joints, no muscle tenderness  SKIN: no rash    Results:   Labs and imaging were ordered for this visit and reviewed by me.  Recent Results (from the past 336 hour(s))   Blood Group A Subtype    Collection Time: 10/13/23  8:16 AM   Result Value Ref Range    A1 Antigen Type Positive     SPECIMEN EXPIRATION DATE [id number]    EBV Capsid Antibody IgM    Collection Time: 10/13/23  8:16 AM   Result Value Ref Range    EBV Capsid Paloma IgM Instrument Value <10.0 <36.0 U/mL    EBV Capsid Antibody IgM No detectable antibody. No detectable antibody.   EBV Capsid Antibody IgG    Collection Time: 10/13/23  8:16 AM   Result Value Ref Range    EBV Capsid Paloma IgG Instrument Value 56.9 (H) <18.0 U/mL    EBV Capsid Antibody IgG Positive (A) No  detectable antibody.   CMV Antibody IgG    Collection Time: 10/13/23  8:16 AM   Result Value Ref Range    CMV Paloma IgG Instrument Value 7.30 (H) <0.60 U/mL    CMV Antibody IgG Positive, suggests recent or past exposure. (A) No detectable antibody.    HCG quantitative pregnancy    Collection Time: 10/13/23  8:16 AM   Result Value Ref Range    hCG Quantitative 2 <5 mIU/mL   West Nile Virus Antibody IgG IgM    Collection Time: 10/13/23  8:16 AM   Result Value Ref Range    West Nile IgG Serum 0.12 <=1.29 IV    West Nile IgM Serum 0.00 <=0.89 IV   Treponema Abs w Reflex to RPR and Titer    Collection Time: 10/13/23  8:16 AM   Result Value Ref Range    Treponema Antibody Total Nonreactive Nonreactive   HIV Antigen Antibody Combo Pretransplant Cascade    Collection Time: 10/13/23  8:16 AM   Result Value Ref Range    HIV Antigen Antibody Combo Pretransplant Nonreactive Nonreactive   Hepatitis C antibody    Collection Time: 10/13/23  8:16 AM   Result Value Ref Range    Hepatitis C Antibody Nonreactive Nonreactive   Hepatitis B surface antigen    Collection Time: 10/13/23  8:16 AM   Result Value Ref Range    Hepatitis B Surface Antigen Nonreactive Nonreactive   Hepatitis B Surface Antibody    Collection Time: 10/13/23  8:16 AM   Result Value Ref Range    Hepatitis B Surface Antibody Instrument Value 0.47 <8.00 m[IU]/mL    Hepatitis B Surface Antibody Nonreactive    Hepatitis B core antibody    Collection Time: 10/13/23  8:16 AM   Result Value Ref Range    Hepatitis B Core Antibody Total Nonreactive Nonreactive   CBC with platelets    Collection Time: 10/13/23  8:16 AM   Result Value Ref Range    WBC Count 3.6 (L) 4.0 - 11.0 10e3/uL    RBC Count 4.59 3.80 - 5.20 10e6/uL    Hemoglobin 13.6 11.7 - 15.7 g/dL    Hematocrit 39.5 35.0 - 47.0 %    MCV 86 78 - 100 fL    MCH 29.6 26.5 - 33.0 pg    MCHC 34.4 31.5 - 36.5 g/dL    RDW 12.7 10.0 - 15.0 %    Platelet Count 236 150 - 450 10e3/uL   Partial thromboplastin time    Collection  Time: 10/13/23  8:16 AM   Result Value Ref Range    aPTT 32 22 - 38 Seconds   INR    Collection Time: 10/13/23  8:16 AM   Result Value Ref Range    INR 1.09 0.85 - 1.15   Hemoglobin A1c    Collection Time: 10/13/23  8:16 AM   Result Value Ref Range    Hemoglobin A1C 5.5 <5.7 %   Phosphorus    Collection Time: 10/13/23  8:16 AM   Result Value Ref Range    Phosphorus 3.0 2.5 - 4.5 mg/dL   Uric acid    Collection Time: 10/13/23  8:16 AM   Result Value Ref Range    Uric Acid 5.3 2.4 - 5.7 mg/dL   Lipid Profile    Collection Time: 10/13/23  8:16 AM   Result Value Ref Range    Cholesterol 229 (H) <200 mg/dL    Triglycerides 134 <150 mg/dL    Direct Measure HDL 49 (L) >=50 mg/dL    LDL Cholesterol Calculated 153 (H) <=100 mg/dL    Non HDL Cholesterol 180 (H) <130 mg/dL   Comprehensive metabolic panel    Collection Time: 10/13/23  8:16 AM   Result Value Ref Range    Sodium 142 135 - 145 mmol/L    Potassium 4.2 3.4 - 5.3 mmol/L    Carbon Dioxide (CO2) 26 22 - 29 mmol/L    Anion Gap 9 7 - 15 mmol/L    Urea Nitrogen 16.7 6.0 - 20.0 mg/dL    Creatinine 0.76 0.51 - 0.95 mg/dL    GFR Estimate >90 >60 mL/min/1.73m2    Calcium 9.2 8.6 - 10.0 mg/dL    Chloride 107 98 - 107 mmol/L    Glucose 100 (H) 70 - 99 mg/dL    Alkaline Phosphatase 62 35 - 104 U/L    AST 17 0 - 45 U/L    ALT 14 0 - 50 U/L    Protein Total 6.8 6.4 - 8.3 g/dL    Albumin 4.6 3.5 - 5.2 g/dL    Bilirubin Total 0.3 <=1.2 mg/dL   Quantiferon TB Gold Plus Grey Tube    Collection Time: 10/13/23  8:16 AM    Specimen: Peripheral Blood   Result Value Ref Range    Quantiferon Nil Tube 0.00 IU/mL   Quantiferon TB Gold Plus Green Tube    Collection Time: 10/13/23  8:16 AM    Specimen: Peripheral Blood   Result Value Ref Range    Quantiferon TB1 Tube 0.00 IU/mL   Quantiferon TB Gold Plus Yellow Tube    Collection Time: 10/13/23  8:16 AM    Specimen: Peripheral Blood   Result Value Ref Range    Quantiferon TB2 Tube 0.00    Quantiferon TB Gold Plus Purple Tube    Collection Time:  10/13/23  8:16 AM    Specimen: Peripheral Blood   Result Value Ref Range    Quantiferon Mitogen 10.00 IU/mL   Adult Type and Screen    Collection Time: 10/13/23  8:16 AM   Result Value Ref Range    ABO/RH(D) A POS     Antibody Screen Negative Negative    SPECIMEN EXPIRATION DATE [id number]    Quantiferon TB Gold Plus    Collection Time: 10/13/23  8:16 AM    Specimen: Peripheral Blood   Result Value Ref Range    Quantiferon-TB Gold Plus Negative Negative    TB1 Ag minus Nil Value 0.00 IU/mL    TB2 Ag minus Nil Value 0.00 IU/mL    Mitogen minus Nil Result 10.00 IU/mL    Nil Result 0.00 IU/mL   ABO Subtyping [[id number]]    Collection Time: 10/13/23  8:32 AM   Result Value Ref Range    A1 Antigen Type Positive    ABO and Rh 2nd type and screen required    Collection Time: 10/13/23  8:32 AM   Result Value Ref Range    ABO/RH(D) A POS     SPECIMEN EXPIRATION DATE [id number]                Again, thank you for allowing me to participate in the care of your patient.      Sincerely,    Ghanshyam Hammond MD

## 2023-10-13 NOTE — LETTER
10/13/2023         RE: Josy Uribe  [address]  [place] [address]        Dear Colleague,    Thank you for referring your patient, Josy Uribe, to the [facility] TRANSPLANT CLINIC. Please see a copy of my visit note below.    [facility] Solid Organ Transplant  Outpatient MNT: Kidney Donor Evaluation    Current BMI: 36 (HT 62 in,  lbs/90 kg)  Weight loss per MD discretion     8 Year Estimated Risk of T2DM  18%- , BMI 36, low HDL      Time Spent: 15 minutes  Visit Type: Initial  Referring Physician: Alexys   Pt accompanied by: self     Nutrition Assessment  I talked with pt back in May on the telephone to review strategies for weight loss prior to donor evaluation (goal told to patient was to reach 180# prior to coming into clinic). Since that time, she has cut out snacking on candy and has gone from 1 meal/day--> TID meals. Her weight at home is down 7 lbs to 189 lbs, yet not reflective on clinic scale today.     H/o kidney stones: no   H/o GDM or HTN in pregnancy (if applicable): no  Parental h/o DM: no    Vitamins, Supplements, Pertinent Meds: occasional vit D, B, CoQ10  Herbal Medicines/Supplements: none     Weight hx: per CE, weight same as in May (196 lbs)  Wt Readings from Last 10 Encounters:   10/13/23 89.5 kg (197 lb 6.4 oz)   10/13/23 89.5 kg (197 lb 5 oz)   5/2023 196 lbs  1/2023 194 lbs  12/2022 190 lbs  9/2021 189 lbs     Diet Recall  Pt reports seldom eating red meat and has cut back on cholesterol foods. She reports being told during her colonoscopy that her cholesterol was high - unable to see any mention of this or recent labs verifying this.  Breakfast WW toast (margarine) w/ oatmeal   Lunch Salad (spring mix with strawberries, orange, rasp vinaigrette) + chicken    Dinner Tacos (chicken)   Snacks Nutri grain bar; almonds w/ craisins & white cheddar cheese; fruit     Beverages Water (40 oz), coffee (24 oz/day)    Alcohol None    Dining out  2-3x/week (tacos, bowl from [employer])     Physical Activity  No routine activity    Has treadmill but not functional now     Labs  Recent Labs   Lab Test 10/13/23  0816   CHOL 229*   HDL 49*   *   TRIG 134     FBG = 100  A1c = 5.5  BP = wnl x 3     Prediction of Incident Diabetes Mellitus in Middle-aged Adults: The [place] Offspring Study  Stephane Mccrary MD; James B. Meigs, MD, MPH; Niecy Aquino, PhD; Brenna Kwon MD, MPH; Jimbo Courtney MD; Adam Hill Sr,   PhD  Pt's estimated risk for T2DM (per Table 6 above)  Pt received points for the following criteria: FBG>100, BMI>30, low HDL   Total points: 20  8-Year estimated risk of T2DM: 18%    Nutrition Diagnosis  No nutrition diagnosis identified at this time.    Nutrition Intervention  Nutrition education provided:  Reviewed overall healthy diet guidelines for pre and post kidney donation. Discussed maintenance of a healthy weight and Na+ intake <3000 mg/day (<2000 mg/day if HTN).    Reviewed further weight loss TBD by surgeon vs maintaining weight & avoiding weight regain. Her weight in actuality has been stable over the years. Consider some physical activity (which patient is wanting to do, unsure on time line when she can use her treadmill). Consider adding protein in the AM a few times/week.     Avoid the following post op d/t unknown effects on the organs:  - Herbal, Chinese, holistic, chiropractic, natural, alternative medicines and supplements  - Detoxes and cleanses  - Weight loss pills  - Protein powders or other products with extracts or herbs (ie green tea extract)    Patient Understanding: Pt verbalized understanding of education provided.  Expected Engagement: Good  Follow-Up Plans: PRN     Nutrition Goals  No nutrition goals identified at this time     Lauren Goel, RD, LD, CCTD        [facility]  Consult Note     Medical record number: [id number]  YOB: 1970,     Date of Visit:   10/13/2023  Consult requested by the patient for evaluation of kidney donation candidacy.    52 yo female wanting to donate to   Obesity BMI 37, abd quite obese  Chronic back pain, need to raise her leg on a chair while seated in the clinic  Takes Ibuprofen daily  Chronic degenerative disc disease  Pain predominantly shooting to right buttock  No insurance now, medical assistance due to high income  Works as a counselor    Between the obesity, chronic back pain, Ibuprofen use - I think she should not proceed with donation at this time, unless above factors are modified favorably    Risks of the surgical procedure including but not limited to the rare risk of mortality discussed in detail. Patient verbalized good understanding and had several pertinent questions which were answered satisfactorily.            Assessment and Recommendations: Ms. Tao Uribe appears to be a good candidate for kidney donation at this point in the evaluation. The following issues will need to be addressed prior to formal review:    Iohexol ordered for today for assessment of adequate kidney function for donation. Will be reviewed when resulted  Donor labs ordered for today and reviewed to include: CBC, CMP, Mg, PO4, hemoglobin A1c, lipid panel, blood type x 2, hemoglobin A1c, UA with microscopic, urine culture, urine protein/cr, INR/PTT, Quantiferon gold, hepatitis C (antibody), hepatitis B panel, HIV, treponemia, West Nile (antibody panel), CMV (antibody panel), EBV (antibody panel), pregnancy screen (for females of childbearing age), PSA (males >50yrs), HLA tissue typing, buccal swab for eplet typing  Social work consult ordered  Dietician consult ordered  Transplant nephrology consult ordered  Transplant coordinator consult ordered  BLAIRE (independent living donor advovate) consult ordered  EKG ordered for today and will be reviewed when resulted. Suitable to proceed today with this testing today: No  Chest x-ray ordered  for today and will be reviewed when resulted. Suitable to proceed with this testing today:  No  CT angio of abdomen and pelvis for anatomical assessment. Images and report to be reviewed when resulted.  Suitable to proceed with this testing today:  No  After review of the above, additional testing/concerns include:  pause [id number]    The majority of our visit today was spent in counseling regarding the medical and surgical risks of kidney donation, the typical brandee-and post-operative experience and recovery/return to work pattern.  We also talked about post-op visits and longer term health care maintenance, as well as the implications of having one remaining kidney. This discussion included, but was not limited to rates of complications such as bleeding, infection, need for transfusion, reoperation, other organ injury, future bowel obstruction, incisional hernia, port site pain, varicocele, venous thrombosis, pulmonary embolism, renal failure, and death (3 per 10,000). At the conclusion of the visit, all questions had been answered and Ms. Tao Uribe's candidacy for donation will be reviewed at our Multidisciplinary Donor Selection Committee.     60 min spent on the date of the encounter in chart review, patient visit,  documentation and/or discussion with other providers about the issues documented above.    .  Nino Fong in Immunology and Transplantation  Surgical Director, Kidney & Pancreas Transplant Programs  Medical Director, Solid Organ Transplant Unit    ---------------------------------------------------------------------------------------------------    HPI: Ms. Tao Uribe wishes to donate a kidney to .           NO  Personal history of cancer    []         Comment:     Personal history of kidney problems   []         Comment:   Personal history of diabetes    []         Comment:                  Bladder emptying problems (prostate, urinary retention) []         Comment:   Neck  or Back problems:     []         Comment:   Constipation      []         Comment:       Frequent NSAID use:         []         Comment:       Other:        []         Comment:                Past Medical History:   Diagnosis Date    Endometriosis     treated in past     Past Surgical History:   Procedure Laterality Date    CYST REMOVAL  2002     Family History   Problem Relation Age of Onset    Thrombosis Mother     Failure to thrive Father     Cancer Brother      Social History     Socioeconomic History    Marital status:      Spouse name: Not on file    Number of children: Not on file    Years of education: Not on file    Highest education level: Not on file   Occupational History    Not on file   Tobacco Use    Smoking status: Former     Years: 24     Types: Cigarettes     Quit date: 2008     Years since quitting: 15.7    Smokeless tobacco: Never   Substance and Sexual Activity    Alcohol use: Not Currently     Comment: 10 drinks a week    Drug use: Never    Sexual activity: Not on file   Other Topics Concern    Not on file   Social History Narrative    Not on file     Social Determinants of Health     Financial Resource Strain: Not on file   Food Insecurity: Not on file   Transportation Needs: Not on file   Physical Activity: Not on file   Stress: Not on file   Social Connections: Not on file   Interpersonal Safety: Not on file   Housing Stability: Not on file       ROS:   CONSTITUTIONAL:  No fevers or chills  EYES: negative for icterus  ENT:  negative for hearing loss, tinnitus and sore throat  RESPIRATORY:  negative for cough, sputum, dyspnea  CARDIOVASCULAR:  negative for chest pain  GASTROINTESTINAL:  negative for nausea, vomiting, diarrhea or constipation  GENITOURINARY:  negative for incontinence, dysuria, bladder emptying problems  HEME:  No easy bruising  INTEGUMENT:  negative for rash and pruritus  NEURO:  Negative for headache, seizure disorder    Allergies:   No Known  "Allergies    Medications:  Clinic-Administered Medications as of 10/13/2023         Dose Frequency Start End    iohexol (OMNIPAQUE) 350 MG/ML injectable solution 4 mL (Completed) 4 mL ONCE 10/13/2023 10/13/2023    Route: Intravenous          Exam:   Temp:  [97.6  F (36.4  C)-98  F (36.7  C)] 97.6  F (36.4  C)  Pulse:  [59-62] 62  Resp:  [16] 16  BP: (117-132)/(66-85) 122/77  SpO2:  [95 %-98 %] 95 %  Appearance: in no apparent distress.   Skin: normal  Head and Neck: Normal, no rashes or jaundice  Respiratory: normal respiratory excursions, no audible wheeze  Cardiovascular: RRR  Abdomen: obese, No surgical scars   Extremeties: Edema, none  Neuro: without deficit       Labs:   ABO: A POS  Chemistries:   Recent Labs   Lab Test 10/13/23  0816      POTASSIUM 4.2   CHLORIDE 107   CO2 26   ANIONGAP 9   *   BUN 16.7   CR 0.76   JAZMÍN 9.2       Urine Studies: No lab results found.  No lab results found.    Hematology:      Recent Labs   Lab Test 10/13/23  0816   WBC 3.6*   RBC 4.59   HGB 13.6   HCT 39.5   MCV 86   MCH 29.6   MCHC 34.4   RDW 12.7          Coags:   Recent Labs   Lab Test 10/13/23  0816   INR 1.09       Lipid Profile:   Cholesterol   Date Value Ref Range Status   10/13/2023 229 (H) <200 mg/dL Final     Triglycerides   Date Value Ref Range Status   10/13/2023 134 <150 mg/dL Final     Direct Measure HDL   Date Value Ref Range Status   10/13/2023 49 (L) >=50 mg/dL Final     LDL Cholesterol Calculated   Date Value Ref Range Status   10/13/2023 153 (H) <=100 mg/dL Final     Non HDL Cholesterol   Date Value Ref Range Status   10/13/2023 180 (H) <130 mg/dL Final       Virals:  CMV and EBV pending.   No lab results found.   No results found for: \"HCVAB\", \"HQTG\", \"HCGENO\", \"HCPCR\", \"HQTRNA\", \"HEPRNA\", \"CRYOG\"    No results found for: \"HCVAB\", \"HBSAB\", \"HBSAG\"         Psychosocial Evaluation  Living Organ Donation per OPTN Policy 14.1.A  Organ Type: Kidney  Presenting Information:  Micki presents to " the [facility], [facility], Solid Organ Transplant Clinic to complete a psychosocial evaluation since she is interested in becoming a kidney donor for her  Ulises.    PERSONAL BACKGROUND:  Current Living Situation: Micki lives in a house in [place], [place] with her  Ulises and their 3 pets.     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 Micki reports her works is flexible as she is self employed. We discussed the option for donor shield for lost wages and travel and lodging reimbursement during donation and she was interested.     Health Insurance Status: No current insurance. Dicussed the need for insurance for ongoing medical care with her PCP post donation. She reports she is currently in the process of obtaining this and has a phone call scheduled for later today.     Family History: Micki and Ulises have been  for 24 years. Micki has 4 biological children from a previous relationship and Ulises has 2. Micki's children include - Yuli(35), Whitley (34), Antony (32), and Alexander (31). Ellen children are Gaby (27) and Summer (25). They have 10 grandchildren in total. Micki's parents are  and she has 3 living brothers who she rarely speaks to.     General Health: Micki reports she doctors regularly at a [facility]. She does not have a current HCD but is aware of our [facility] policy regarding NOK and has no issues with this.     Mental Health: Micki denies any past or present treatment for mental health issues, such as anxiety, depression, bipolar disorder, or disorders of thought such as schizophrenia or schizoaffective disorder.  There is no history of personality disorder or eating disorders.  She denies any need to see a counselor or therapist at this time. Micki denies any past suicidal ideation, plans, or past  "attempts. She denies any use of psychotropic medications at this time or in the past.  Micki denies any past history of hospitalization for psychiatric illnesses. There is no past history of ADHD or ADD. She denies any history of educational issues or need for special educational services in their past history.     Alcohol and Drug Use/Abuse/Dependency: Micki reports she does not drink alcohol or use other drugs or substances. She has no history of treatment or legal consequences due to drug or alcohol use. There is no history of DUI's.     Cigarette Use: None reported.     Legal: No legal history reported.     Coping with major surgery/associated stress: When stressed, Micki will garden, read, and pray.     Support System: Micki reports her adult daughters would be able to assist post donation.     DONOR SPECIFIC INFORMATION:  Relationship to Recipient: Wife    Decision Process/Motivation to Donate: Micki reports she is motivated to \"see if we can save my husbands life\". Micki reports he has been on dialysis for 2 years and this has been a draining for him. She reports 2 of their children have come forward as potential donors but they have been denied.     High risk behaviors as defined by [place] Public Health Services (PHS) that have potential to increase risk of disease transmission were reviewed and no high risk behaviors have been identified.     PREPARATION FOR DONATION, RECOVERY, AND POTENTIAL SHORT-LONG-TERM OUTCOMES:  Understanding of the Living Donation Process:  We discussed the role of living donor .  Short and long term medical and psychosocial risks to both, donor and recipient were reviewed and Micki expressed understanding.  Post surgical restrictions (2 weeks no driving, 6 weeks no lifting over 10 lbs) were reviewed and she appears capable of adhering to the post surgical requirements. The need for a caregiver was discussed and Micki reports her adult daughters would be able to " assist . The risk of poor psychosocial outcome including problems with body image, post-surgery depression or anxiety, or feelings of emotional distress or bereavement if recipient experiences any recurrent disease, poor outcome or death was reviewed.  Additionally, potential financial implications, including the risk of having difficulty obtaining health care insurance, life insurance, disability insurance, or long term care insurance were reviewed, as were available donor grants to assist with donor related expenses.      We also discussed some unique issues that arise with paired kidney donation, which include the uncertainty of the timing and the importance of having a employment situation and support system that is able to provide sustained support and flexibility.    Micki appears capable of understanding this information and making an informed medical decision.    Impressions/Recommendations:   Micki is highly motivated to donate a kidney to her  Ulises. Her decision to donate is free of inducement, coercion, or other undue pressure. Micki's housing, finances and employment are stable.  No current/active mental health or chemical abuse issues were identified.  The need for a caregiver was reviewed and she is able to identify a plan to meet her post operative care needs. Micki appears capable of making an informed medical decision.  No psychosocial contraindications to living organ donation were identified and  I support Micki s desire to donate a kidney to her  Ulises.         Contact Information:   СЕРГЕЙ Aguilar, [facility] Medical CenterSW  SOT/BMT/CF Float      Time Spent: 30 minutes     Sincerely,        Brandon Reardon MD

## 2023-10-15 LAB
GAMMA INTERFERON BACKGROUND BLD IA-ACNC: 0 IU/ML
M TB IFN-G BLD-IMP: NEGATIVE
M TB IFN-G CD4+ BCKGRND COR BLD-ACNC: 10 IU/ML
MITOGEN IGNF BCKGRD COR BLD-ACNC: 0 IU/ML
MITOGEN IGNF BCKGRD COR BLD-ACNC: 0 IU/ML
QUANTIFERON MITOGEN: 10 IU/ML
QUANTIFERON NIL TUBE: 0 IU/ML
QUANTIFERON TB1 TUBE: 0 IU/ML
QUANTIFERON TB2 TUBE: 0
WNV IGG SER IA-ACNC: 0.12 IV
WNV IGM SER IA-ACNC: 0 IV

## 2023-10-16 NOTE — PROGRESS NOTES
Psychosocial Evaluation  Living Organ Donation per OPTN Policy 14.1.A  Organ Type: Kidney  Presenting Information:  Micki presents to the [facility], Solid Organ Transplant Clinic to complete a psychosocial evaluation since she is interested in becoming a kidney donor for her  Ulises.    PERSONAL BACKGROUND:  Current Living Situation: Micki lives in a house in [place], [place] with her  Ulises and their 3 pets.     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 Micki reports her works is flexible as she is self employed. We discussed the option for donor shield for lost wages and travel and lodging reimbursement during donation and she was interested.     Health Insurance Status: No current insurance. Dicussed the need for insurance for ongoing medical care with her PCP post donation. She reports she is currently in the process of obtaining this and has a phone call scheduled for later today.     Family History: Micki and Ulises have been  for 24 years. Micki has 4 biological children from a previous relationship and Ulises has 2. Micki's children include - Yuli(35), Whitley (34), Antony (32), and Alexander (31). Ellen children are Gaby (27) and Summer (25). They have 10 grandchildren in total. Micki's parents are  and she has 3 living brothers who she rarely speaks to.     General Health: Micki reports she doctors regularly at a [facility]. She does not have a current HCD but is aware of our [facility] policy regarding NOK and has no issues with this.     Mental Health: Micki denies any past or present treatment for mental health issues, such as anxiety, depression, bipolar disorder, or disorders of thought such as schizophrenia or schizoaffective disorder.  There is no history of personality disorder or eating  "disorders.  She denies any need to see a counselor or therapist at this time. Micki denies any past suicidal ideation, plans, or past attempts. She denies any use of psychotropic medications at this time or in the past.  Micki denies any past history of hospitalization for psychiatric illnesses. There is no past history of ADHD or ADD. She denies any history of educational issues or need for special educational services in their past history.     Alcohol and Drug Use/Abuse/Dependency: Micki reports she does not drink alcohol or use other drugs or substances. She has no history of treatment or legal consequences due to drug or alcohol use. There is no history of DUI's.     Cigarette Use: None reported.     Legal: No legal history reported.     Coping with major surgery/associated stress: When stressed, Micki will garden, read, and pray.     Support System: Micki reports her adult daughters would be able to assist post donation.     DONOR SPECIFIC INFORMATION:  Relationship to Recipient: Wife    Decision Process/Motivation to Donate: Micki reports she is motivated to \"see if we can save my husbands life\". Micki reports he has been on dialysis for 2 years and this has been a draining for him. She reports 2 of their children have come forward as potential donors but they have been denied.     High risk behaviors as defined by [place] Public Health Services (PHS) that have potential to increase risk of disease transmission were reviewed and no high risk behaviors have been identified.     PREPARATION FOR DONATION, RECOVERY, AND POTENTIAL SHORT-LONG-TERM OUTCOMES:  Understanding of the Living Donation Process:  We discussed the role of living donor .  Short and long term medical and psychosocial risks to both, donor and recipient were reviewed and Micki expressed understanding.  Post surgical restrictions (2 weeks no driving, 6 weeks no lifting over 10 lbs) were reviewed and she appears capable of adhering " to the post surgical requirements. The need for a caregiver was discussed and Micki reports her adult daughters would be able to assist . The risk of poor psychosocial outcome including problems with body image, post-surgery depression or anxiety, or feelings of emotional distress or bereavement if recipient experiences any recurrent disease, poor outcome or death was reviewed.  Additionally, potential financial implications, including the risk of having difficulty obtaining health care insurance, life insurance, disability insurance, or long term care insurance were reviewed, as were available donor grants to assist with donor related expenses.      We also discussed some unique issues that arise with paired kidney donation, which include the uncertainty of the timing and the importance of having a employment situation and support system that is able to provide sustained support and flexibility.    Micki appears capable of understanding this information and making an informed medical decision.    Impressions/Recommendations:   Micki is highly motivated to donate a kidney to her  Ulises. Her decision to donate is free of inducement, coercion, or other undue pressure. Micki's housing, finances and employment are stable.  No current/active mental health or chemical abuse issues were identified.  The need for a caregiver was reviewed and she is able to identify a plan to meet her post operative care needs. Micki appears capable of making an informed medical decision.  No psychosocial contraindications to living organ donation were identified and  I support Micki s desire to donate a kidney to her  Ulises.         Contact Information:   СЕРГЕЙ Aguilar, LICSW  SOT/BMT/CF Float      Time Spent: 30 minutes

## 2023-10-18 ENCOUNTER — TELEPHONE (OUTPATIENT)
Dept: TRANSPLANT | Facility: CLINIC | Age: 53
End: 2023-10-18

## 2023-10-18 ENCOUNTER — COMMITTEE REVIEW (OUTPATIENT)
Dept: TRANSPLANT | Facility: CLINIC | Age: 53
End: 2023-10-18

## 2023-10-18 NOTE — COMMITTEE REVIEW
Living Donor Committee Review Note Evaluation Date: 10/13/2023  Committee Review Date: 10/18/2023    Donor being evaluated for: Kidney    Transplant Phase: Evaluation  Transplant Status: Active    Transplant Coordinator: Jessica Draper  Transplant Surgeon:       Committee Review Members:  Independent Living Donor Advocate Yvonne Bonds, [facility], Danni Slaughter   Nephrology Rayray Lowry MD, Ghanshyam Hammond MD   Nutrition Lauren Goel, CARLOS   Pharmacist Marily Mckeon, [facility]    - Clinical Nadiya Slaughter [facility]   Transplant Adalgisa Larissa Crystal, RN, Trang Cruz, RN, Radhika Kwon, RN, Ambrose Rhodes, RN, Marily Rangel, HAI, Jessica Ferris, RN   Transplant Surgery Michael Felix MD       Transplant Eligibility: Acceptable Mental Health, Acceptable Physical Health    Committee Review Decision: Declined    Relative Contraindications: None    Absolute Contraindications: None    Committee Chair Michael Felix MD verbally attested to the committee's decision.    Committee Discussion Details: will be declined for the following reasons:  BMI 36.09  Chronic back pain and unable to sit down at evaluation   Chronic degenerative disc disease   Taking ibuprofen daily

## 2023-10-18 NOTE — TELEPHONE ENCOUNTER
Spoke to Micki regarding committee review results:    Committee Discussion Details: will be declined for the following reasons:  BMI 36.09  Chronic back pain and unable to sit down at evaluation   Chronic degenerative disc disease   Taking ibuprofen daily

## 2023-10-20 PROBLEM — E66.812 CLASS 2 SEVERE OBESITY DUE TO EXCESS CALORIES WITH SERIOUS COMORBIDITY IN ADULT (H): Status: ACTIVE | Noted: 2023-10-20

## 2023-10-20 PROBLEM — M47.814 DJD (DEGENERATIVE JOINT DISEASE) OF THORACIC SPINE: Status: ACTIVE | Noted: 2023-10-20

## 2023-10-20 PROBLEM — E66.01 CLASS 2 SEVERE OBESITY DUE TO EXCESS CALORIES WITH SERIOUS COMORBIDITY IN ADULT (H): Status: ACTIVE | Noted: 2023-10-20

## 2023-10-20 PROBLEM — E78.5 DYSLIPIDEMIA: Status: ACTIVE | Noted: 2023-10-20

## 2023-10-20 PROBLEM — E55.9 VITAMIN D DEFICIENCY: Status: ACTIVE | Noted: 2023-10-20

## 2023-10-20 NOTE — PROGRESS NOTES
TRANSPLANT NEPHROLOGY DONOR EVALUATION    Assessment and Plan:  # Prospective Kidney Transplant Donor: Patient with a few issues that need to be addressed prior to donation. Patient's blood pressure is acceptable at this visit, kidney function appears to be acceptable with Iohexol pending, and urinalysis is bland.    # Obesity: Patient has a BMI of 36.1 with significant central obesity and would recommend weight loss to decrease surgical risk and for their overall health.  Will defer to Transplant Surgery on any weight loss goal and acceptability for donation.     # Chronic Back Pain: Patient with significant and ongoing back pain, which requires regular use of NSAIDs to manage.  Discussed that her symptoms could worsen following donor surgery.  Also discussed potential long-term risk of regular NSAID use, including kidney damage.  Do not feel patient is a good donor candidate due to this risk and medical issue.    # Low WBC: Mildly low WBC.  Patient denies any infectious symptoms.  She does tend to run low normal WBC.   - Would repeat WBC.  This can be followed up by her PCP.    # Family h/o Blood Clots: Patient reports her mother had a blood clot, although details are not known.  Patient has no h/o blood clots, including with a couple of minor surgeries.   - Would consider further evaluation should patient require a surgery.    Discussed the risks of donating a kidney, including the surgical risk and the possible risks of living with one kidney.    Education about expected post-donation kidney function and how chronic kidney disease (CKD) and end stage kidney disease (ESKD) might potentially impact the donor in the future, include, but not limited to:       - On average, donors will have 25-35% permanent loss of kidney function at donation.       - Baseline risk of ESKD may slightly exceed that of members of the general        population with the same demographic profile.       - Donor risks must be interpreted in  light of known epidemiology of both CKD or         ESKD, such as that CKD generally develops in midlife (40-50 years old) and ESKD         generally develops after age 60.       - Medical evaluation of young potential donors cannot predict lifetime risk of CKD or         ESKD.       - Donors may be at higher risk for CKD if they sustain damage to the remaining         kidney.       - Development of CKD and progression of ESKD may be more rapid with only 1         kidney.       - Some type of kidney replacement therapy, either kidney transplant or dialysis, is         required when reaching ESKD.    Potential medical or surgical risks include, but not limited to:       - Death.       - Scars, pain, fatigue, and other consequences typical of any surgical procedure.       - Decreased kidney function.       - Abdominal or bowel symptoms, such as bloating and nausea, and developing         bowel obstruction.       - Kidney failure (ESKD) and the need for a kidney transplant or dialysis for the donor.       - Impact of obesity, hypertension, or other donor-specific medical conditions on         morbidity and mortality of the potential donor.    Patients overall evaluation will be discussed with the transplant team and a final recommendation on the patients' suitability to be a kidney transplant donor will be made at that time.    Consult:  Josy Uribe was seen in consultation at the request of Dr. Brandon Reardon for evaluation as a potential kidney transplant donor.    Reason for Visit:  Josy Uribe is a 53 year old female who presents for a kidney donor evaluation.  Patient would like to donate to Ulises MasonRony, her  .    Present Condition and Donor-Related Medical History:    Energy level is good and has been normal.  She is active, although only gets minimal exercise.  Patient's activity can be limited by chronic back pain.  Denies any chest pain or shortness of breath with exertion.  Appetite  is good and no recent weight change.  No nausea, vomiting or diarrhea.  No fever, sweats or chills, but does get hot flashes.  No leg swelling.  No pain or burning with urination.    Patient's ongoing back pain has been an issues for years.  She presently takes ibuprofen 600 mg daily on most days for several years.  She has been trying to live with the pain.  Patient has tried physical therapy, chiropractor and message therapy without success.  She does not have health insurance and hasn't been evaluated by an orthopedic surgeon or neurosurgeon.           Kidney Disease Hx:       h/o Kidney Problems: No   Family h/o Genetic Kidney Disease: No       h/o Hypertension: No      Usual Blood Pressure:  110/60-70s       h/o Protein in Urine: No    h/o Blood in Urine: No       h/o Kidney Stones: No     h/o Kidney Injury: No       h/o Recurrent UTI: No   h/o Genitourinary Problems: No       h/o Chronic NSAID Use: Yes          Other Medical Hx:       h/o Diabetes: No             h/o Gastrointestinal, Pancreas or Liver Problems: No       h/o Lung or Heart Problems: No       h/o Hematologic Problems: No  h/o Bleeding or Clotting Problems: No       h/o Cancer: No       h/o Infection Problems: No       H/o Gestational DM: No      H/o Gestational HTN: No     H/o Preeclampsia: No         Skin Cancer Risk:       h/o more than 50 moles: No       h/o extensive sun exposure: No       h/o melanoma: No       Family h/o melanoma: Yes: In her brother         Mental Health Assessment:       h/o Depression: No       h/o Psychiatric Illness: No       h/o Suicidal Attempt(s): No    COVID Status:  Vaccination Up To Date: Not vaccinated  H/o COVID Infection: Yes; with no residual symptoms.     Review Of Systems:   A comprehensive review of systems was obtained and negative, except as noted in the HPI or PMH.    Past Medical History:   History was taken from the patient as noted below.  Past Medical History:   Diagnosis Date    Alcoholism (H)      DJD (degenerative joint disease) of thoracic spine     Dyslipidemia     Endometriosis     treated in past    Obesity     Palpitations     Vitamin D deficiency        Past Social History:   Past Surgical History:   Procedure Laterality Date    CYST REMOVAL  2002    LAPAROSCOPY DIAGNOSTIC (GENERAL)      WISDOM TOOTH EXTRACTION       Personal or family history of anesthesia problems: No    Family History:   Family History   Problem Relation Age of Onset    Thrombosis Mother     Failure to thrive Father     Cerebrovascular Disease Father     Melanoma Brother     Hypertension Brother           Specific Family History in First Degree Relatives:       FH of Kidney Dz: No  FH of Diabetes: No       FH of Hypertension: Yes  FH of CAD: No       FH of Cancer: Yes   FH of Kidney Cancer: No    Personal History:   Social History     Socioeconomic History    Marital status:      Spouse name: Not on file    Number of children: 4    Years of education: Not on file    Highest education level: Not on file   Occupational History    Not on file   Tobacco Use    Smoking status: Former     Years: 24     Types: Cigarettes     Quit date: 2008     Years since quitting: 15.8    Smokeless tobacco: Never   Substance and Sexual Activity    Alcohol use: Not Currently     Comment: Sober since 1996    Drug use: Never    Sexual activity: Not on file   Other Topics Concern    Not on file   Social History Narrative    Not on file     Social Determinants of Health     Financial Resource Strain: Not on file   Food Insecurity: Not on file   Transportation Needs: Not on file   Physical Activity: Not on file   Stress: Not on file   Social Connections: Not on file   Interpersonal Safety: Not on file   Housing Stability: Not on file          Specific Social History:       Health Insurance Status: No       Employment Status: Part time  Occupation: Mental health practitioner and licensed counselor                     Living Arrangements: lives with their  spouse       Social Support: Yes       Presence of increased risk for disease transmission behaviors as defined by PHS guidelines: No        Allergies:  No Known Allergies    Medications:  No current outpatient medications on file.     No current facility-administered medications for this visit.     There are no discontinued medications.      Vitals:      10/13/2023     9:04 AM 10/13/2023     9:06 AM 10/13/2023     9:07 AM   Vital Signs   Systolic 121 117 122   Diastolic 66 79 77       Exam:   GENERAL APPEARANCE: alert and no distress  HENT: mouth without ulcers or lesions  RESP: lungs clear to auscultation - no rales, rhonchi or wheezes  CV: regular rhythm, normal rate, no rub, no murmur  EDEMA: no LE edema bilaterally  ABDOMEN: soft, nondistended, nontender, bowel sounds normal, obese  MS: extremities normal - no gross deformities noted, no evidence of inflammation in joints, no muscle tenderness  SKIN: no rash    Results:   Labs and imaging were ordered for this visit and reviewed by me.  Recent Results (from the past 336 hour(s))   Blood Group A Subtype    Collection Time: 10/13/23  8:16 AM   Result Value Ref Range    A1 Antigen Type Positive     SPECIMEN EXPIRATION DATE [id number]    EBV Capsid Antibody IgM    Collection Time: 10/13/23  8:16 AM   Result Value Ref Range    EBV Capsid Paloma IgM Instrument Value <10.0 <36.0 U/mL    EBV Capsid Antibody IgM No detectable antibody. No detectable antibody.   EBV Capsid Antibody IgG    Collection Time: 10/13/23  8:16 AM   Result Value Ref Range    EBV Capsid Paloma IgG Instrument Value 56.9 (H) <18.0 U/mL    EBV Capsid Antibody IgG Positive (A) No detectable antibody.   CMV Antibody IgG    Collection Time: 10/13/23  8:16 AM   Result Value Ref Range    CMV Paloma IgG Instrument Value 7.30 (H) <0.60 U/mL    CMV Antibody IgG Positive, suggests recent or past exposure. (A) No detectable antibody.    HCG quantitative pregnancy    Collection Time: 10/13/23  8:16 AM   Result  Value Ref Range    hCG Quantitative 2 <5 mIU/mL   West Nile Virus Antibody IgG IgM    Collection Time: 10/13/23  8:16 AM   Result Value Ref Range    West Nile IgG Serum 0.12 <=1.29 IV    West Nile IgM Serum 0.00 <=0.89 IV   Treponema Abs w Reflex to RPR and Titer    Collection Time: 10/13/23  8:16 AM   Result Value Ref Range    Treponema Antibody Total Nonreactive Nonreactive   HIV Antigen Antibody Combo Pretransplant Cascade    Collection Time: 10/13/23  8:16 AM   Result Value Ref Range    HIV Antigen Antibody Combo Pretransplant Nonreactive Nonreactive   Hepatitis C antibody    Collection Time: 10/13/23  8:16 AM   Result Value Ref Range    Hepatitis C Antibody Nonreactive Nonreactive   Hepatitis B surface antigen    Collection Time: 10/13/23  8:16 AM   Result Value Ref Range    Hepatitis B Surface Antigen Nonreactive Nonreactive   Hepatitis B Surface Antibody    Collection Time: 10/13/23  8:16 AM   Result Value Ref Range    Hepatitis B Surface Antibody Instrument Value 0.47 <8.00 m[IU]/mL    Hepatitis B Surface Antibody Nonreactive    Hepatitis B core antibody    Collection Time: 10/13/23  8:16 AM   Result Value Ref Range    Hepatitis B Core Antibody Total Nonreactive Nonreactive   CBC with platelets    Collection Time: 10/13/23  8:16 AM   Result Value Ref Range    WBC Count 3.6 (L) 4.0 - 11.0 10e3/uL    RBC Count 4.59 3.80 - 5.20 10e6/uL    Hemoglobin 13.6 11.7 - 15.7 g/dL    Hematocrit 39.5 35.0 - 47.0 %    MCV 86 78 - 100 fL    MCH 29.6 26.5 - 33.0 pg    MCHC 34.4 31.5 - 36.5 g/dL    RDW 12.7 10.0 - 15.0 %    Platelet Count 236 150 - 450 10e3/uL   Partial thromboplastin time    Collection Time: 10/13/23  8:16 AM   Result Value Ref Range    aPTT 32 22 - 38 Seconds   INR    Collection Time: 10/13/23  8:16 AM   Result Value Ref Range    INR 1.09 0.85 - 1.15   Hemoglobin A1c    Collection Time: 10/13/23  8:16 AM   Result Value Ref Range    Hemoglobin A1C 5.5 <5.7 %   Phosphorus    Collection Time: 10/13/23  8:16 AM    Result Value Ref Range    Phosphorus 3.0 2.5 - 4.5 mg/dL   Uric acid    Collection Time: 10/13/23  8:16 AM   Result Value Ref Range    Uric Acid 5.3 2.4 - 5.7 mg/dL   Lipid Profile    Collection Time: 10/13/23  8:16 AM   Result Value Ref Range    Cholesterol 229 (H) <200 mg/dL    Triglycerides 134 <150 mg/dL    Direct Measure HDL 49 (L) >=50 mg/dL    LDL Cholesterol Calculated 153 (H) <=100 mg/dL    Non HDL Cholesterol 180 (H) <130 mg/dL   Comprehensive metabolic panel    Collection Time: 10/13/23  8:16 AM   Result Value Ref Range    Sodium 142 135 - 145 mmol/L    Potassium 4.2 3.4 - 5.3 mmol/L    Carbon Dioxide (CO2) 26 22 - 29 mmol/L    Anion Gap 9 7 - 15 mmol/L    Urea Nitrogen 16.7 6.0 - 20.0 mg/dL    Creatinine 0.76 0.51 - 0.95 mg/dL    GFR Estimate >90 >60 mL/min/1.73m2    Calcium 9.2 8.6 - 10.0 mg/dL    Chloride 107 98 - 107 mmol/L    Glucose 100 (H) 70 - 99 mg/dL    Alkaline Phosphatase 62 35 - 104 U/L    AST 17 0 - 45 U/L    ALT 14 0 - 50 U/L    Protein Total 6.8 6.4 - 8.3 g/dL    Albumin 4.6 3.5 - 5.2 g/dL    Bilirubin Total 0.3 <=1.2 mg/dL   Quantiferon TB Gold Plus Grey Tube    Collection Time: 10/13/23  8:16 AM    Specimen: Peripheral Blood   Result Value Ref Range    Quantiferon Nil Tube 0.00 IU/mL   Quantiferon TB Gold Plus Green Tube    Collection Time: 10/13/23  8:16 AM    Specimen: Peripheral Blood   Result Value Ref Range    Quantiferon TB1 Tube 0.00 IU/mL   Quantiferon TB Gold Plus Yellow Tube    Collection Time: 10/13/23  8:16 AM    Specimen: Peripheral Blood   Result Value Ref Range    Quantiferon TB2 Tube 0.00    Quantiferon TB Gold Plus Purple Tube    Collection Time: 10/13/23  8:16 AM    Specimen: Peripheral Blood   Result Value Ref Range    Quantiferon Mitogen 10.00 IU/mL   Adult Type and Screen    Collection Time: 10/13/23  8:16 AM   Result Value Ref Range    ABO/RH(D) A POS     Antibody Screen Negative Negative    SPECIMEN EXPIRATION DATE [id number]    Quantiferon TB Gold Plus     Collection Time: 10/13/23  8:16 AM    Specimen: Peripheral Blood   Result Value Ref Range    Quantiferon-TB Gold Plus Negative Negative    TB1 Ag minus Nil Value 0.00 IU/mL    TB2 Ag minus Nil Value 0.00 IU/mL    Mitogen minus Nil Result 10.00 IU/mL    Nil Result 0.00 IU/mL   ABO Subtyping [[id number]]    Collection Time: 10/13/23  8:32 AM   Result Value Ref Range    A1 Antigen Type Positive    ABO and Rh 2nd type and screen required    Collection Time: 10/13/23  8:32 AM   Result Value Ref Range    ABO/RH(D) A POS     SPECIMEN EXPIRATION DATE [id number]

## 2024-07-28 ENCOUNTER — HEALTH MAINTENANCE LETTER (OUTPATIENT)
Age: 54
End: 2024-07-28

## 2025-08-10 ENCOUNTER — HEALTH MAINTENANCE LETTER (OUTPATIENT)
Age: 55
End: 2025-08-10